# Patient Record
Sex: FEMALE | Race: WHITE | NOT HISPANIC OR LATINO
[De-identification: names, ages, dates, MRNs, and addresses within clinical notes are randomized per-mention and may not be internally consistent; named-entity substitution may affect disease eponyms.]

---

## 2021-06-21 ENCOUNTER — APPOINTMENT (OUTPATIENT)
Dept: PEDIATRIC ORTHOPEDIC SURGERY | Facility: CLINIC | Age: 76
End: 2021-06-21
Payer: MEDICARE

## 2021-06-21 VITALS — HEIGHT: 62.4 IN | BODY MASS INDEX: 36.39 KG/M2 | TEMPERATURE: 97.3 F | WEIGHT: 200.3 LBS

## 2021-06-21 PROBLEM — Z00.00 ENCOUNTER FOR PREVENTIVE HEALTH EXAMINATION: Status: ACTIVE | Noted: 2021-06-21

## 2021-06-21 PROCEDURE — 99213 OFFICE O/P EST LOW 20 MIN: CPT | Mod: 25

## 2021-06-21 PROCEDURE — 20605 DRAIN/INJ JOINT/BURSA W/O US: CPT

## 2021-06-21 RX ORDER — OMEPRAZOLE 40 MG/1
40 CAPSULE, DELAYED RELEASE ORAL
Qty: 90 | Refills: 0 | Status: ACTIVE | COMMUNITY
Start: 2021-03-27

## 2021-06-21 RX ORDER — METFORMIN HYDROCHLORIDE 500 MG/1
500 TABLET, COATED ORAL
Qty: 180 | Refills: 0 | Status: ACTIVE | COMMUNITY
Start: 2021-03-27

## 2021-06-21 RX ORDER — LISINOPRIL 5 MG/1
5 TABLET ORAL
Qty: 90 | Refills: 0 | Status: ACTIVE | COMMUNITY
Start: 2021-05-25

## 2021-06-21 RX ORDER — ATORVASTATIN CALCIUM 40 MG/1
40 TABLET, FILM COATED ORAL
Qty: 90 | Refills: 0 | Status: ACTIVE | COMMUNITY
Start: 2020-10-21

## 2021-06-21 RX ORDER — TRIAMTERENE AND HYDROCHLOROTHIAZIDE 50; 75 MG/1; MG/1
75-50 TABLET ORAL
Qty: 90 | Refills: 0 | Status: ACTIVE | COMMUNITY
Start: 2021-05-25

## 2021-06-21 RX ADMIN — Medication 0 %: at 00:00

## 2021-06-21 RX ADMIN — METHYLPREDNISOLONE SODIUM SUCCINATE 0 MG/ML: 500 INJECTION, POWDER, FOR SOLUTION INTRAMUSCULAR; INTRAVENOUS at 00:00

## 2021-06-21 NOTE — PROCEDURE
[FreeTextEntry1] : Patient has been injected into the left wrist with 2 mL of 1% plain lidocaine and 1 mL of 40 mg of Depo-Medrol.

## 2021-06-21 NOTE — HISTORY OF PRESENT ILLNESS
[FreeTextEntry1] : This 75-year-old female returns for recurrence of left carpal tunnel syndrome.  She has significant numbness in the median nerve distribution of the left hand.  Pain will also radiate up her left forearm.

## 2021-06-21 NOTE — PHYSICAL EXAM
[de-identified] : On physical examination the patient has a positive Tinel and Phalen sign on the left.  She has some flattening of the the thenar eminence musculature.  She has a full range of motion of the wrist and she has developed a small ganglion in the radial aspect of the volar aspect of the wrist.

## 2021-06-21 NOTE — PHYSICAL EXAM
[de-identified] : On physical examination the patient has a positive Tinel and Phalen sign on the left.  She has some flattening of the the thenar eminence musculature.  She has a full range of motion of the wrist and she has developed a small ganglion in the radial aspect of the volar aspect of the wrist.

## 2021-06-21 NOTE — PHYSICAL EXAM
[de-identified] : On physical examination the patient has a positive Tinel and Phalen sign on the left.  She has some flattening of the the thenar eminence musculature.  She has a full range of motion of the wrist and she has developed a small ganglion in the radial aspect of the volar aspect of the wrist.

## 2021-06-21 NOTE — ASSESSMENT
[FreeTextEntry1] : Left carpal tunnel syndrome\par \par The patient was advised that surgical intervention would be necessary if she does not improve at this time.

## 2021-06-22 ENCOUNTER — MED ADMIN CHARGE (OUTPATIENT)
Age: 76
End: 2021-06-22

## 2021-06-22 RX ORDER — LIDOCAINE HYDROCHLORIDE 10 MG/ML
1 INJECTION, SOLUTION INFILTRATION; PERINEURAL
Qty: 0 | Refills: 0 | Status: ACTIVE | OUTPATIENT
Start: 2021-06-21

## 2021-06-22 RX ORDER — LIDOCAINE HYDROCHLORIDE 10 MG/ML
1 INJECTION, SOLUTION INFILTRATION; PERINEURAL
Qty: 0 | Refills: 0 | Status: COMPLETED | OUTPATIENT
Start: 2021-06-22

## 2021-06-22 RX ORDER — METHYLPRED ACET/NACL,ISO-OS/PF 40 MG/ML
40 VIAL (ML) INJECTION
Qty: 1 | Refills: 0 | Status: COMPLETED | OUTPATIENT
Start: 2021-06-21

## 2021-06-22 RX ADMIN — LIDOCAINE HYDROCHLORIDE 0 %: 10 INJECTION, SOLUTION INFILTRATION; PERINEURAL at 00:00

## 2023-07-06 ENCOUNTER — APPOINTMENT (OUTPATIENT)
Dept: PEDIATRIC ORTHOPEDIC SURGERY | Facility: CLINIC | Age: 78
End: 2023-07-06
Payer: MEDICARE

## 2023-07-06 VITALS — TEMPERATURE: 97 F | WEIGHT: 200 LBS | HEIGHT: 62.4 IN | BODY MASS INDEX: 36.34 KG/M2

## 2023-07-06 DIAGNOSIS — S46.011A STRAIN OF MUSCLE(S) AND TENDON(S) OF THE ROTATOR CUFF OF RIGHT SHOULDER, INITIAL ENCOUNTER: ICD-10-CM

## 2023-07-06 PROCEDURE — 99213 OFFICE O/P EST LOW 20 MIN: CPT | Mod: 25

## 2023-07-06 PROCEDURE — 20610 DRAIN/INJ JOINT/BURSA W/O US: CPT | Mod: RT

## 2023-07-06 PROCEDURE — 73030 X-RAY EXAM OF SHOULDER: CPT | Mod: RT

## 2023-07-06 RX ORDER — METHYLPREDNISOLONE 4 MG/1
4 TABLET ORAL
Qty: 1 | Refills: 1 | Status: ACTIVE | COMMUNITY
Start: 2023-07-06 | End: 1900-01-01

## 2023-07-06 NOTE — PHYSICAL EXAM
[de-identified] : Exam reveals normal motion to the cervical spine no spasm or tenderness the right shoulder has no overt inflammatory changes present.  She has poor motion to the shoulder in all planes.  The joints distally in extremity are unremarkable as is the neurovascular status.\par \par X-rays ordered and taken today of the right shoulder reveal mild degenerative changes no lesion

## 2023-07-06 NOTE — HISTORY OF PRESENT ILLNESS
[de-identified] : This 77-year-old is seen with a 1 week history of acute onset of severe pain in her right shoulder with restricted motion with no obvious history of traumatic or precipitating event no neck pain no numbness or paresthesias her pain does not radiate distally.  Prior to this she had been doing well

## 2023-07-06 NOTE — ASSESSMENT
[FreeTextEntry1] : Impression: Strain right rotator cuff.\par \par This patient has been injected into the subacromial space and she has been placed on a Medrol Dosepak.  She will call if she is not progressing

## 2024-06-11 ENCOUNTER — APPOINTMENT (OUTPATIENT)
Dept: PEDIATRIC ORTHOPEDIC SURGERY | Facility: CLINIC | Age: 79
End: 2024-06-11
Payer: MEDICARE

## 2024-06-11 VITALS — HEIGHT: 64 IN | TEMPERATURE: 96.6 F | BODY MASS INDEX: 30.56 KG/M2 | WEIGHT: 179 LBS

## 2024-06-11 DIAGNOSIS — G56.02 CARPAL TUNNEL SYNDROME, LEFT UPPER LIMB: ICD-10-CM

## 2024-06-11 PROCEDURE — 99212 OFFICE O/P EST SF 10 MIN: CPT | Mod: 25

## 2024-06-11 PROCEDURE — 20526 THER INJECTION CARP TUNNEL: CPT | Mod: LT

## 2024-06-11 NOTE — PHYSICAL EXAM
[de-identified] : On physical examination there is a full range of motion of the left shoulder and elbow.  Examination of the left wrist and hand reveals a positive Tinel and positive Phalen signs.  She has thenar eminence atrophy on the left but not on the right.  She can achieve a full range of motion of her fingers.  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

## 2024-06-11 NOTE — PROCEDURE
[FreeTextEntry1] : 1 mL of 40 mg of Depo-Medrol and 1 mL of 2% plain lidocaine had been injected into the left carpal tunnel without adverse reaction

## 2024-06-11 NOTE — ASSESSMENT
[FreeTextEntry1] : Left carpal tunnel syndrome  Because this patient has been dealing with this problem for a long period of time and because she has demonstrated atrophy of the thenar musculature she has been indicated for a left carpal tunnel release.  I discussed the nature of the surgery as well as potential risks and complications.  All questions have been answered.  She will be scheduling the surgery in the near future.

## 2024-06-11 NOTE — HISTORY OF PRESENT ILLNESS
[FreeTextEntry1] : This 78-year-old female returns for reevaluation of a left carpal tunnel syndrome that has become significantly painful.  The patient has constant numbness in the median nerve distribution of the hand.  Wearing a brace does not help.

## 2024-07-10 ENCOUNTER — TRANSCRIPTION ENCOUNTER (OUTPATIENT)
Age: 79
End: 2024-07-10

## 2024-07-11 ENCOUNTER — APPOINTMENT (OUTPATIENT)
Dept: PEDIATRIC ORTHOPEDIC SURGERY | Facility: HOSPITAL | Age: 79
End: 2024-07-11

## 2024-07-25 ENCOUNTER — APPOINTMENT (OUTPATIENT)
Dept: PEDIATRIC ORTHOPEDIC SURGERY | Facility: CLINIC | Age: 79
End: 2024-07-25
Payer: MEDICARE

## 2024-07-25 VITALS
BODY MASS INDEX: 30.56 KG/M2 | DIASTOLIC BLOOD PRESSURE: 80 MMHG | TEMPERATURE: 97 F | HEIGHT: 64 IN | WEIGHT: 179 LBS | SYSTOLIC BLOOD PRESSURE: 140 MMHG

## 2024-07-25 DIAGNOSIS — G56.02 CARPAL TUNNEL SYNDROME, LEFT UPPER LIMB: ICD-10-CM

## 2024-07-25 PROCEDURE — 99024 POSTOP FOLLOW-UP VISIT: CPT

## 2024-07-25 NOTE — HISTORY OF PRESENT ILLNESS
[FreeTextEntry1] : This 78-year-old female is doing well 2 weeks status post left carpal tunnel release.  She no longer has significant pain or numbness.

## 2024-07-25 NOTE — PHYSICAL EXAM
[de-identified] : On physical examination the wound is well-healed and the sutures have been removed.  Patient has a full range of motion of the wrist and fingers.

## 2025-01-30 ENCOUNTER — APPOINTMENT (OUTPATIENT)
Dept: PEDIATRIC ORTHOPEDIC SURGERY | Facility: CLINIC | Age: 80
End: 2025-01-30
Payer: MEDICARE

## 2025-01-30 VITALS
BODY MASS INDEX: 31.58 KG/M2 | WEIGHT: 185 LBS | DIASTOLIC BLOOD PRESSURE: 85 MMHG | TEMPERATURE: 96.9 F | SYSTOLIC BLOOD PRESSURE: 140 MMHG | HEIGHT: 64 IN

## 2025-01-30 DIAGNOSIS — M54.16 RADICULOPATHY, LUMBAR REGION: ICD-10-CM

## 2025-01-30 PROCEDURE — 99212 OFFICE O/P EST SF 10 MIN: CPT

## 2025-01-30 RX ORDER — DICLOFENAC SODIUM 50 MG/1
50 TABLET, DELAYED RELEASE ORAL TWICE DAILY
Qty: 30 | Refills: 2 | Status: ACTIVE | COMMUNITY
Start: 2025-01-30 | End: 1900-01-01

## 2025-01-30 RX ORDER — FAMOTIDINE 10 MG/1
TABLET, FILM COATED ORAL
Refills: 0 | Status: ACTIVE | COMMUNITY